# Patient Record
Sex: MALE | Race: WHITE | NOT HISPANIC OR LATINO
[De-identification: names, ages, dates, MRNs, and addresses within clinical notes are randomized per-mention and may not be internally consistent; named-entity substitution may affect disease eponyms.]

---

## 2023-06-13 PROBLEM — Z00.00 ENCOUNTER FOR PREVENTIVE HEALTH EXAMINATION: Status: ACTIVE | Noted: 2023-06-13

## 2023-06-22 ENCOUNTER — APPOINTMENT (OUTPATIENT)
Dept: OTOLARYNGOLOGY | Facility: CLINIC | Age: 48
End: 2023-06-22
Payer: MEDICAID

## 2023-06-22 VITALS — BODY MASS INDEX: 29.52 KG/M2 | HEIGHT: 74 IN | WEIGHT: 230 LBS

## 2023-06-22 PROCEDURE — 99203 OFFICE O/P NEW LOW 30 MIN: CPT

## 2023-06-22 PROCEDURE — 92504 EAR MICROSCOPY EXAMINATION: CPT

## 2023-06-22 PROCEDURE — 92557 COMPREHENSIVE HEARING TEST: CPT

## 2023-06-22 PROCEDURE — 92567 TYMPANOMETRY: CPT

## 2023-06-22 NOTE — DATA REVIEWED
[de-identified] : In light of the patients current symptoms, Complete audiometry was ordered and completed today. I have interpreted these results and reviewed them in detail with the patient.\par \par Moderate conductive hearing loss right ear.  Stiff tympanometry noted.  Old audiometry provided and reviewed

## 2023-06-22 NOTE — ASSESSMENT
[FreeTextEntry1] : Significant hearing loss affecting the right ear.  The patient reports that this has interfered with daily communication.  I have discussed management options with him in detail.  He wished to consider surgical intervention.  All risks limitations, complications and alternatives reviewed in detail.  Questions answered.\par \par CT scan of the temporal bone recommended.\par \par Follow-up recommended after imaging for further review and management discussion.

## 2023-06-22 NOTE — PHYSICAL EXAM
[FreeTextEntry1] : Procedure: Microscopic Ear Exam\par \par Left ear:  Ear canal intact without inflammation or lesion.  \par Tympanic membrane intact without inflammation.\par \par Right ear:  Ear canal intact without inflammation or lesion.  \par Tympanic membrane intact without inflammation.\par \par [place] Hearing Assessment\par 512 Hz:\par Grace test: referred to the right ear\par Rinne test:\par 	Right Ear: Air Conduction < Bone Conduction\par 	Left Ear:   Air Conduction > Bone conduction [Midline] : trachea located in midline position [Normal] : no rashes

## 2023-06-22 NOTE — HISTORY OF PRESENT ILLNESS
[de-identified] : ALBERTO STEVE has a history of progressive right hearing loss for the past 4 to 5 years.  There is tinnitus noted.  No triggering event reported.  No vertigo reported.  No significant prior history of infectious ear disease.\par \par Family history: Brother has otosclerosis.  \par \par  [FreeTextEntry1] : \par

## 2023-08-10 ENCOUNTER — OUTPATIENT (OUTPATIENT)
Dept: OUTPATIENT SERVICES | Facility: HOSPITAL | Age: 48
LOS: 1 days | End: 2023-08-10
Payer: MEDICAID

## 2023-08-10 ENCOUNTER — RESULT REVIEW (OUTPATIENT)
Age: 48
End: 2023-08-10

## 2023-08-10 ENCOUNTER — APPOINTMENT (OUTPATIENT)
Dept: CT IMAGING | Facility: CLINIC | Age: 48
End: 2023-08-10

## 2023-08-10 PROCEDURE — 70480 CT ORBIT/EAR/FOSSA W/O DYE: CPT | Mod: 26

## 2023-08-16 ENCOUNTER — APPOINTMENT (OUTPATIENT)
Dept: OTOLARYNGOLOGY | Facility: CLINIC | Age: 48
End: 2023-08-16
Payer: MEDICAID

## 2023-08-16 VITALS — HEIGHT: 75.59 IN | WEIGHT: 257.94 LBS | BODY MASS INDEX: 31.74 KG/M2

## 2023-08-16 PROCEDURE — 99214 OFFICE O/P EST MOD 30 MIN: CPT | Mod: 25

## 2023-08-16 PROCEDURE — 92504 EAR MICROSCOPY EXAMINATION: CPT

## 2023-08-16 RX ORDER — EZETIMIBE 10 MG/1
TABLET ORAL
Refills: 0 | Status: ACTIVE | COMMUNITY

## 2023-08-16 RX ORDER — TELMISARTAN 20 MG/1
TABLET ORAL
Refills: 0 | Status: ACTIVE | COMMUNITY

## 2023-08-16 RX ORDER — RANOLAZINE 1000 MG/1
TABLET, FILM COATED, EXTENDED RELEASE ORAL
Refills: 0 | Status: ACTIVE | COMMUNITY

## 2023-08-16 RX ORDER — AMLODIPINE BESYLATE 5 MG/1
TABLET ORAL
Refills: 0 | Status: ACTIVE | COMMUNITY

## 2023-08-16 NOTE — ASSESSMENT
[FreeTextEntry1] : Significant hearing loss in the right ear associated with CT evidence of otosclerosis.  I have reviewed this with the patient and his sister in detail.  I have carefully reviewed management options which includes but is not limited to surgical intervention.  We discussed the option of observation and amplification.  All risks limitations, complications and alternatives reviewed in detail.  Questions answered.  He wished to proceed with surgical planning.  Surgical plan: Right laser stapedotomy, left hand vein graft

## 2023-08-16 NOTE — HISTORY OF PRESENT ILLNESS
[de-identified] : ALBERTO STEVE has a history of progressive right hearing loss for the past 4 to 5 years.  There is tinnitus noted.  No triggering event reported.  No vertigo reported.  No significant prior history of infectious ear disease.\par  \par  Family history: Brother has otosclerosis.  \par  \par   [FreeTextEntry1] : 08/16/2023  Reports pressure sensation and fullness in the right ear. Had CT scan . No change in hearing.

## 2023-08-16 NOTE — PHYSICAL EXAM
[Normal] : temporomandibular joint is normal [FreeTextEntry1] : Procedure: Microscopic Ear Exam  Left ear:  Ear canal intact without inflammation or lesion.   Tympanic membrane intact without inflammation.  Right ear:  Ear canal intact without inflammation or lesion.   Tympanic membrane intact without inflammation.

## 2023-08-16 NOTE — CONSULT LETTER
[Please see my note below.] : Please see my note below. [FreeTextEntry2] : Dear SHOAIB ARIAS  [FreeTextEntry1] : Thank you for allowing me to participate in the care of ALBERTO STEVE . Please see the attached visit note.    José Manuel Raines Otology Medical Director of [facility] Department of Otolaryngology [facility]

## 2023-09-05 ENCOUNTER — TRANSCRIPTION ENCOUNTER (OUTPATIENT)
Age: 48
End: 2023-09-05

## 2023-10-06 ENCOUNTER — APPOINTMENT (OUTPATIENT)
Dept: OTOLARYNGOLOGY | Facility: CLINIC | Age: 48
End: 2023-10-06

## 2023-10-06 ENCOUNTER — APPOINTMENT (OUTPATIENT)
Dept: OTOLARYNGOLOGY | Facility: CLINIC | Age: 48
End: 2023-10-06
Payer: MEDICAID

## 2023-10-06 PROCEDURE — 92557 COMPREHENSIVE HEARING TEST: CPT

## 2023-10-06 PROCEDURE — 92550 TYMPANOMETRY & REFLEX THRESH: CPT | Mod: 52

## 2023-10-12 ENCOUNTER — TRANSCRIPTION ENCOUNTER (OUTPATIENT)
Age: 48
End: 2023-10-12

## 2023-10-12 NOTE — ASU PATIENT PROFILE, ADULT - NSICDXPASTMEDICALHX_GEN_ALL_CORE_FT
PAST MEDICAL HISTORY:  Fatty liver     H/O otosclerosis     Hepatic steatosis     HLD (hyperlipidemia)     HTN (hypertension)     MARY ANN treated with BiPAP

## 2023-10-13 ENCOUNTER — OUTPATIENT (OUTPATIENT)
Dept: OUTPATIENT SERVICES | Facility: HOSPITAL | Age: 48
LOS: 1 days | Discharge: HOME | End: 2023-10-13
Payer: MEDICAID

## 2023-10-13 ENCOUNTER — RESULT REVIEW (OUTPATIENT)
Age: 48
End: 2023-10-13

## 2023-10-13 ENCOUNTER — TRANSCRIPTION ENCOUNTER (OUTPATIENT)
Age: 48
End: 2023-10-13

## 2023-10-13 ENCOUNTER — APPOINTMENT (OUTPATIENT)
Dept: OTOLARYNGOLOGY | Facility: AMBULATORY SURGERY CENTER | Age: 48
End: 2023-10-13

## 2023-10-13 VITALS
RESPIRATION RATE: 16 BRPM | HEART RATE: 92 BPM | SYSTOLIC BLOOD PRESSURE: 149 MMHG | WEIGHT: 260.59 LBS | DIASTOLIC BLOOD PRESSURE: 96 MMHG | HEIGHT: 76 IN | TEMPERATURE: 98 F | OXYGEN SATURATION: 99 %

## 2023-10-13 VITALS
HEART RATE: 80 BPM | TEMPERATURE: 97 F | OXYGEN SATURATION: 97 % | DIASTOLIC BLOOD PRESSURE: 88 MMHG | SYSTOLIC BLOOD PRESSURE: 133 MMHG | RESPIRATION RATE: 16 BRPM

## 2023-10-13 DIAGNOSIS — Z48.89 ENCOUNTER FOR OTHER SPECIFIED SURGICAL AFTERCARE: ICD-10-CM

## 2023-10-13 DIAGNOSIS — Z90.49 ACQUIRED ABSENCE OF OTHER SPECIFIED PARTS OF DIGESTIVE TRACT: Chronic | ICD-10-CM

## 2023-10-13 PROCEDURE — 95867 NDL EMG CRANIAL NRV MUSC UNI: CPT | Mod: 26

## 2023-10-13 PROCEDURE — 37799 UNLISTED PX VASCULAR SURGERY: CPT | Mod: LT

## 2023-10-13 PROCEDURE — 88311 DECALCIFY TISSUE: CPT | Mod: 26

## 2023-10-13 PROCEDURE — 88304 TISSUE EXAM BY PATHOLOGIST: CPT | Mod: 26

## 2023-10-13 PROCEDURE — 69661 REVISE MIDDLE EAR BONE: CPT

## 2023-10-13 DEVICE — IMP STAPES OSSICULAR BARTELS 4X4.25MM: Type: IMPLANTABLE DEVICE | Site: RIGHT | Status: FUNCTIONAL

## 2023-10-13 DEVICE — FIBER IMPERIUM 300 MICRON: Type: IMPLANTABLE DEVICE | Site: RIGHT | Status: FUNCTIONAL

## 2023-10-13 RX ORDER — RANOLAZINE 500 MG/1
1 TABLET, FILM COATED, EXTENDED RELEASE ORAL
Refills: 0 | DISCHARGE

## 2023-10-13 RX ORDER — HYDROMORPHONE HYDROCHLORIDE 2 MG/ML
0.5 INJECTION INTRAMUSCULAR; INTRAVENOUS; SUBCUTANEOUS
Refills: 0 | Status: DISCONTINUED | OUTPATIENT
Start: 2023-10-13 | End: 2023-10-13

## 2023-10-13 RX ORDER — OXYCODONE HYDROCHLORIDE 5 MG/1
5 TABLET ORAL ONCE
Refills: 0 | Status: DISCONTINUED | OUTPATIENT
Start: 2023-10-13 | End: 2023-10-13

## 2023-10-13 RX ORDER — FAMOTIDINE 10 MG/ML
1 INJECTION INTRAVENOUS
Refills: 0 | DISCHARGE

## 2023-10-13 RX ORDER — APREPITANT 80 MG/1
40 CAPSULE ORAL ONCE
Refills: 0 | Status: COMPLETED | OUTPATIENT
Start: 2023-10-13 | End: 2023-10-13

## 2023-10-13 RX ORDER — SODIUM CHLORIDE 9 MG/ML
1000 INJECTION, SOLUTION INTRAVENOUS ONCE
Refills: 0 | Status: COMPLETED | OUTPATIENT
Start: 2023-10-13 | End: 2023-10-13

## 2023-10-13 RX ORDER — FENTANYL CITRATE 50 UG/ML
50 INJECTION INTRAVENOUS
Refills: 0 | Status: DISCONTINUED | OUTPATIENT
Start: 2023-10-13 | End: 2023-10-13

## 2023-10-13 RX ORDER — SODIUM CHLORIDE 9 MG/ML
500 INJECTION, SOLUTION INTRAVENOUS
Refills: 0 | Status: DISCONTINUED | OUTPATIENT
Start: 2023-10-13 | End: 2023-10-13

## 2023-10-13 RX ORDER — TELMISARTAN AND AMLODIPINE 40; 5 MG/1; MG/1
1 TABLET ORAL
Refills: 0 | DISCHARGE

## 2023-10-13 RX ORDER — ACETAMINOPHEN 500 MG
1000 TABLET ORAL ONCE
Refills: 0 | Status: COMPLETED | OUTPATIENT
Start: 2023-10-13 | End: 2023-10-13

## 2023-10-13 RX ADMIN — Medication 1000 MILLIGRAM(S): at 12:43

## 2023-10-13 RX ADMIN — APREPITANT 40 MILLIGRAM(S): 80 CAPSULE ORAL at 12:43

## 2023-10-13 RX ADMIN — OXYCODONE HYDROCHLORIDE 5 MILLIGRAM(S): 5 TABLET ORAL at 17:55

## 2023-10-13 RX ADMIN — SODIUM CHLORIDE 1000 MILLILITER(S): 9 INJECTION, SOLUTION INTRAVENOUS at 17:20

## 2023-10-13 RX ADMIN — OXYCODONE HYDROCHLORIDE 5 MILLIGRAM(S): 5 TABLET ORAL at 18:25

## 2023-10-13 NOTE — ASU DISCHARGE PLAN (ADULT/PEDIATRIC) - NS MD DC FALL RISK RISK
For information on Fall & Injury Prevention, visit: [email or web address] OR  [email or web address] OR  https://www.cdc.gov/steadi/patient.html

## 2023-10-13 NOTE — ASU DISCHARGE PLAN (ADULT/PEDIATRIC) - CARE PROVIDER_API CALL
José Manuel Raines  Otolaryngology  [address] [place] [address], [address]  [place] [address]  Phone: [phone]  Fax: [phone]  Follow Up Time:

## 2023-10-13 NOTE — PRE-ANESTHESIA EVALUATION ADULT - NSANTHADDINFOFT_GEN_ALL_CORE
Pt accepts all anesthesia risks IBNLT: Dental, Pharyngeal, Laryngeal, Tracheal Trauma, Sore Throat, Hoarseness, Recurrent Laryngeal Nerve Dysfunction, Upper and Lower Extremity Paresthesias, Nausea and Vomiting, Potential exacerbation of asthma and MARY ANN.

## 2023-10-18 ENCOUNTER — APPOINTMENT (OUTPATIENT)
Dept: OTOLARYNGOLOGY | Facility: CLINIC | Age: 48
End: 2023-10-18
Payer: MEDICAID

## 2023-10-18 PROBLEM — E78.5 HYPERLIPIDEMIA, UNSPECIFIED: Chronic | Status: ACTIVE | Noted: 2023-10-13

## 2023-10-18 PROBLEM — Z86.69 PERSONAL HISTORY OF OTHER DISEASES OF THE NERVOUS SYSTEM AND SENSE ORGANS: Chronic | Status: ACTIVE | Noted: 2023-10-13

## 2023-10-18 PROBLEM — I10 ESSENTIAL (PRIMARY) HYPERTENSION: Chronic | Status: ACTIVE | Noted: 2023-10-13

## 2023-10-18 PROBLEM — K76.0 FATTY (CHANGE OF) LIVER, NOT ELSEWHERE CLASSIFIED: Chronic | Status: ACTIVE | Noted: 2023-10-13

## 2023-10-18 PROBLEM — G47.33 OBSTRUCTIVE SLEEP APNEA (ADULT) (PEDIATRIC): Chronic | Status: ACTIVE | Noted: 2023-10-13

## 2023-10-18 PROCEDURE — 99024 POSTOP FOLLOW-UP VISIT: CPT

## 2023-10-24 LAB
SURGICAL PATHOLOGY STUDY: SIGNIFICANT CHANGE UP
SURGICAL PATHOLOGY STUDY: SIGNIFICANT CHANGE UP

## 2023-11-15 ENCOUNTER — APPOINTMENT (OUTPATIENT)
Dept: OTOLARYNGOLOGY | Facility: CLINIC | Age: 48
End: 2023-11-15
Payer: MEDICAID

## 2023-11-15 VITALS — HEIGHT: 75.59 IN | WEIGHT: 257.94 LBS | BODY MASS INDEX: 31.74 KG/M2

## 2023-11-15 DIAGNOSIS — H90.11 CONDUCTIVE HEARING LOSS, UNILATERAL, RIGHT EAR, WITH UNRESTRICTED HEARING ON THE CONTRALATERAL SIDE: ICD-10-CM

## 2023-11-15 PROCEDURE — 99024 POSTOP FOLLOW-UP VISIT: CPT

## 2023-11-15 PROCEDURE — 92567 TYMPANOMETRY: CPT

## 2023-11-15 PROCEDURE — 92557 COMPREHENSIVE HEARING TEST: CPT

## 2023-11-30 ENCOUNTER — APPOINTMENT (OUTPATIENT)
Dept: OTOLARYNGOLOGY | Facility: CLINIC | Age: 48
End: 2023-11-30

## 2023-12-07 ENCOUNTER — APPOINTMENT (OUTPATIENT)
Dept: OTOLARYNGOLOGY | Facility: CLINIC | Age: 48
End: 2023-12-07
Payer: MEDICAID

## 2023-12-07 DIAGNOSIS — K21.9 GASTRO-ESOPHAGEAL REFLUX DISEASE W/OUT ESOPHAGITIS: ICD-10-CM

## 2023-12-07 DIAGNOSIS — Z78.9 OTHER SPECIFIED HEALTH STATUS: ICD-10-CM

## 2023-12-07 DIAGNOSIS — R09.89 OTHER SPECIFIED SYMPTOMS AND SIGNS INVOLVING THE CIRCULATORY AND RESPIRATORY SYSTEMS: ICD-10-CM

## 2023-12-07 DIAGNOSIS — J34.2 DEVIATED NASAL SEPTUM: ICD-10-CM

## 2023-12-07 PROCEDURE — 99214 OFFICE O/P EST MOD 30 MIN: CPT | Mod: 25

## 2023-12-07 PROCEDURE — 31231 NASAL ENDOSCOPY DX: CPT | Mod: 78

## 2023-12-07 RX ORDER — METHYLPREDNISOLONE 4 MG/1
4 TABLET ORAL
Qty: 1 | Refills: 0 | Status: COMPLETED | COMMUNITY
Start: 2023-10-13 | End: 2023-12-07

## 2023-12-07 RX ORDER — CEFADROXIL 500 MG/1
500 CAPSULE ORAL TWICE DAILY
Qty: 4 | Refills: 1 | Status: COMPLETED | COMMUNITY
Start: 2023-10-13 | End: 2023-12-07

## 2023-12-12 PROBLEM — H90.11 CONDUCTIVE HEARING LOSS OF RIGHT EAR WITH UNRESTRICTED HEARING OF LEFT EAR: Status: ACTIVE | Noted: 2023-12-12

## 2024-01-17 ENCOUNTER — APPOINTMENT (OUTPATIENT)
Dept: OTOLARYNGOLOGY | Facility: CLINIC | Age: 49
End: 2024-01-17
Payer: MEDICAID

## 2024-01-17 DIAGNOSIS — H80.91 UNSPECIFIED OTOSCLEROSIS, RIGHT EAR: ICD-10-CM

## 2024-01-17 DIAGNOSIS — H93.299 OTHER ABNORMAL AUDITORY PERCEPTIONS, UNSPECIFIED EAR: ICD-10-CM

## 2024-01-17 DIAGNOSIS — H61.20 IMPACTED CERUMEN, UNSPECIFIED EAR: ICD-10-CM

## 2024-01-17 DIAGNOSIS — H90.71 MIXED CONDUCTIVE AND SENSORINEURAL HEARING LOSS, UNILATERAL, RIGHT EAR, WITH UNRESTRICTED HEARING ON THE CONTRALATERAL SIDE: ICD-10-CM

## 2024-01-17 PROCEDURE — 92567 TYMPANOMETRY: CPT

## 2024-01-17 PROCEDURE — 92557 COMPREHENSIVE HEARING TEST: CPT

## 2024-01-17 PROCEDURE — 99213 OFFICE O/P EST LOW 20 MIN: CPT | Mod: 25

## 2024-01-17 NOTE — PHYSICAL EXAM
[Normal] : temporomandibular joint is normal [FreeTextEntry1] : Microscopic ear exam with cerumen debridement:  Right ear: Obstructing cerumen was debrided from the ear canal using suction, and curet.  The ear canal was within normal limits.  The tympanic membrane was intact and noninflamed.  Left ear: The ear canal was patent and nonobstructed.  The tympanic membrane was intact and noninflamed.

## 2024-01-17 NOTE — ASSESSMENT
[FreeTextEntry1] : Significant hearing improvement in the right ear following right ear surgery in October 2023.  A high-frequency hearing loss remains with tinnitus.  I have discussed this with him and have reviewed coping strategies.  Continued clinical monitoring advised.  Follow-up with repeat audiometry in 6 months.

## 2024-01-17 NOTE — HISTORY OF PRESENT ILLNESS
[de-identified] : ALBERTO STEVE has a history of progressive right hearing loss for the past 4 to 5 years.  There is tinnitus noted.  No triggering event reported.  No vertigo reported.  No significant prior history of infectious ear disease.  Family history: Brother has otosclerosis.   Oct 2023: right laser stapedotomy   [FreeTextEntry1] : 01/17/2024  hearing better. mild tinniuts reported in the right ear.

## 2024-01-17 NOTE — DATA REVIEWED
[de-identified] : In light of the patients current symptoms, Complete audiometry was ordered and completed today. I have interpreted these results and reviewed them in detail with the patient.  Mild to moderate high-frequency hearing loss affecting the right ear with intact speech recognition bilaterally.

## 2024-07-17 ENCOUNTER — APPOINTMENT (OUTPATIENT)
Dept: OTOLARYNGOLOGY | Facility: CLINIC | Age: 49
End: 2024-07-17

## (undated) DEVICE — SUT SILK 3-0 18" TIES

## (undated) DEVICE — DRSG STRIP OTOSILK OTOLOGICAL

## (undated) DEVICE — DRSG MASTISOL

## (undated) DEVICE — BLADE TYMPANOPLASTY 2.5MM W 60 DEGREE BEVEL DOWN

## (undated) DEVICE — KNIFE ALCON MVR V-LANCE 23G (BLACK)

## (undated) DEVICE — DRAPE STERI-DRAPE MEDIUM W APERTURE

## (undated) DEVICE — VENODYNE/SCD SLEEVE CALF MEDIUM

## (undated) DEVICE — BLADE BEAVER MINI MIS 6400

## (undated) DEVICE — SUT MONOCRYL 5-0 18" P-1 UNDYED

## (undated) DEVICE — GLV 7.5 PROTEXIS (WHITE)

## (undated) DEVICE — PROBE INCREMT PRASS STD TIP

## (undated) DEVICE — DRSG TEGADERM 2.5X3"

## (undated) DEVICE — BLADE SCALPEL SAFETY #15 WITH PLASTIC GREEN HANDLE

## (undated) DEVICE — PETRI DISH MED 3.5"

## (undated) DEVICE — ELCTR NDL SUBDERMAL 2 CHANNEL

## (undated) DEVICE — BUR DMD RD 0.7X100MM

## (undated) DEVICE — DRAPE MICROSCOPE ZEISS

## (undated) DEVICE — PREP ALCOHOL PAD MED

## (undated) DEVICE — DRAPE VARI-LENS2 MICROSCPOPE 68MM

## (undated) DEVICE — SUT PLAIN GUT FAST ABSORBING 5-0 PC-1

## (undated) DEVICE — WARMING BLANKET FULL ADULT

## (undated) DEVICE — GOWN SLEEVES

## (undated) DEVICE — DURABLE MEDICAL EQUIPMENT: Type: DURABLE MEDICAL EQUIPMENT

## (undated) DEVICE — SUT VICRYL 3-0 18" TIES UNDYED

## (undated) DEVICE — SCOPE COLIBRI MICRO ENT DISP

## (undated) DEVICE — BEAVER BLADE MIN-BLADE ROUNDED TIP 1-SIDE SHARP (GREEN)

## (undated) DEVICE — PACK MASTOID/MIDDLE EAR

## (undated) DEVICE — COTTONBALL LG

## (undated) DEVICE — DRAPE APERTURE

## (undated) DEVICE — SUT VICRYL 3-0 18" PS-2 UNDYED

## (undated) DEVICE — SUT MONOCRYL 3-0 27" PS-2 UNDYED

## (undated) DEVICE — CATH IV SAFE INSYTE 18G X 1.88" (GREEN)

## (undated) DEVICE — DRSG GAUZE SPONGE 2X2" STERILE

## (undated) DEVICE — NDL BLD BEAVER CUTTING EDGE 3.0MM

## (undated) DEVICE — BUR GRACE MEDICAL DIAMOND ROUND 0.8X95MM

## (undated) DEVICE — DRSG STERISTRIPS 0.25 X 4"